# Patient Record
(demographics unavailable — no encounter records)

---

## 2025-07-21 NOTE — HISTORY OF PRESENT ILLNESS
[FreeTextEntry1] : Annual wellness visit [de-identified] : Jennifer She/her Mother: Kaitlyn (she/her) [facility]. Then Katrin Young. Looking into bottom surgery.  Getting far with [facility].  Not sure about next appt.  Ready to do it as soon as possible.  Working on hair removal. No other gender affirming surgeries. Lamotrigine, Vyvanse, aripiprazole, Metoformin/hydrochloric, bupropion.  Optional is dextroamphetamine and quetiapine when symptoms flare. Dr. Shayan Ashford.  Sees him once a month. Autism.  ADHD.  Anxiety.  Mild depression. Previously, saw Dr. Rodriguez and got gender dysphoria. ARFA diagnosis.  Avoidant resistant food avoidance.  Has a talk therapist. Pt here for annual wellness visit and to establish care. Estradiol 3 tabs a day (6mg).  In HS. [place] 50mg.  3 tabs daily.   Last hormone levels were earlier this year.  Maybe in January 2025. Mother has concerns about Jennifer's weight.  Has become a concern lately with surgery prep.  Can't be above a certain weight.  Mother wants her to be healthy.  Maybe lost about 10 lbs. Was just at a program for three years in [facility].  Learning indepent skills.  Has been home for a week.  Trying to focus on more physical activity and healthy eating. Had breathing issues when younger.  But outgrew them. Just accepted to [employer].  Starting courses in fall.  Digital filmmaking certificate. Lives with mother.  Two cats.  Hasting on [place]. Non smoker. No alcohol. For exercise, has started walking.  Getting membership at [employer].  Might start working with . Thinks she's up to date with immunizations. Gets yearly flu and COVID. Recognized gender difference at age 15.  April 2019.  Came out Oct 2019 first to mom and then to others.   HRT in March 2020. 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 Legal changes in Nov 2020. Feels like transition ahs gone well.  Feels like more could be done.  More full figure.  Feels awkward about weight. Would like wider hips. Progesterone 300mg nightly.

## 2025-07-21 NOTE — HEALTH RISK ASSESSMENT
[No] : In the past 12 months have you used drugs other than those required for medical reasons? No [1] : 2) Feeling down, depressed, or hopeless for several days (1) [PHQ-2 Negative - No further assessment needed] : PHQ-2 Negative - No further assessment needed [Never] : Never [0-4] : 0-4 [[id number]] : 2

## 2025-07-21 NOTE — PHYSICAL EXAM
[No Acute Distress] : no acute distress [Well Nourished] : well nourished [Well Developed] : well developed [Well-Appearing] : well-appearing [Normal Sclera/Conjunctiva] : normal sclera/conjunctiva [PERRL] : pupils equal round and reactive to light [EOMI] : extraocular movements intact [Normal Outer Ear/Nose] : the outer ears and nose were normal in appearance [Normal Oropharynx] : the oropharynx was normal [No JVD] : no jugular venous distention [No Lymphadenopathy] : no lymphadenopathy [Supple] : supple [Thyroid Normal, No Nodules] : the thyroid was normal and there were no nodules present [No Respiratory Distress] : no respiratory distress  [No Accessory Muscle Use] : no accessory muscle use [Clear to Auscultation] : lungs were clear to auscultation bilaterally [Normal Rate] : normal rate  [Regular Rhythm] : with a regular rhythm [Normal S1, S2] : normal S1 and S2 [No Murmur] : no murmur heard [No Carotid Bruits] : no carotid bruits [No Abdominal Bruit] : a ~M bruit was not heard ~T in the abdomen [No Varicosities] : no varicosities [Pedal Pulses Present] : the pedal pulses are present [No Edema] : there was no peripheral edema [No Palpable Aorta] : no palpable aorta [No Extremity Clubbing/Cyanosis] : no extremity clubbing/cyanosis [Soft] : abdomen soft [Non Tender] : non-tender [Non-distended] : non-distended [No Masses] : no abdominal mass palpated [No HSM] : no HSM [Normal Bowel Sounds] : normal bowel sounds [Normal Posterior Cervical Nodes] : no posterior cervical lymphadenopathy [Normal Anterior Cervical Nodes] : no anterior cervical lymphadenopathy [No CVA Tenderness] : no CVA  tenderness [No Spinal Tenderness] : no spinal tenderness [No Joint Swelling] : no joint swelling [Grossly Normal Strength/Tone] : grossly normal strength/tone [No Rash] : no rash [Coordination Grossly Intact] : coordination grossly intact [No Focal Deficits] : no focal deficits [Normal Gait] : normal gait [Deep Tendon Reflexes (DTR)] : deep tendon reflexes were 2+ and symmetric [Normal Insight/Judgement] : insight and judgment were intact [de-identified] : Female appearing with long hair and feminine clothes [de-identified] : Blunted affect.

## 2025-07-21 NOTE — ASSESSMENT
[Vaccines Reviewed] : Immunizations reviewed today. Please see immunization details in the vaccine log within the immunization flowsheet.  [FreeTextEntry1] : Continue to see psychiatrist and behavior health team for treatment. Up to date with immunizations.  Get flu/COVID shots in the fall. No cancer screening tests appropriate based on anatomy/age/risk